# Patient Record
Sex: FEMALE | URBAN - METROPOLITAN AREA
[De-identification: names, ages, dates, MRNs, and addresses within clinical notes are randomized per-mention and may not be internally consistent; named-entity substitution may affect disease eponyms.]

---

## 2021-02-24 ENCOUNTER — APPOINTMENT (RX ONLY)
Dept: URBAN - METROPOLITAN AREA CLINIC 73 | Facility: CLINIC | Age: 56
Setting detail: DERMATOLOGY
End: 2021-02-24

## 2021-02-24 VITALS — TEMPERATURE: 98 F

## 2021-02-24 DIAGNOSIS — L29.8 OTHER PRURITUS: ICD-10-CM | Status: INADEQUATELY CONTROLLED

## 2021-02-24 DIAGNOSIS — L29.89 OTHER PRURITUS: ICD-10-CM | Status: INADEQUATELY CONTROLLED

## 2021-02-24 PROCEDURE — 99204 OFFICE O/P NEW MOD 45 MIN: CPT

## 2021-02-24 PROCEDURE — ? COUNSELING

## 2021-02-24 PROCEDURE — ? TREATMENT REGIMEN

## 2021-02-24 PROCEDURE — ? PRESCRIPTION

## 2021-02-24 RX ORDER — CRISABOROLE 20 MG/G
OINTMENT TOPICAL
Qty: 1 | Refills: 1 | Status: ERX | COMMUNITY
Start: 2021-02-24

## 2021-02-24 RX ADMIN — CRISABOROLE: 20 OINTMENT TOPICAL at 00:00

## 2021-02-24 ASSESSMENT — LOCATION DETAILED DESCRIPTION DERM
LOCATION DETAILED: RIGHT DISTAL PRETIBIAL REGION
LOCATION DETAILED: PERIUMBILICAL SKIN
LOCATION DETAILED: RIGHT SUPERIOR MEDIAL MIDBACK
LOCATION DETAILED: RIGHT ANTERIOR PROXIMAL THIGH
LOCATION DETAILED: LEFT ANTERIOR PROXIMAL THIGH
LOCATION DETAILED: LEFT PROXIMAL PRETIBIAL REGION

## 2021-02-24 ASSESSMENT — LOCATION SIMPLE DESCRIPTION DERM
LOCATION SIMPLE: ABDOMEN
LOCATION SIMPLE: RIGHT LOWER BACK
LOCATION SIMPLE: RIGHT THIGH
LOCATION SIMPLE: RIGHT PRETIBIAL REGION
LOCATION SIMPLE: LEFT PRETIBIAL REGION
LOCATION SIMPLE: LEFT THIGH

## 2021-02-24 ASSESSMENT — LOCATION ZONE DERM
LOCATION ZONE: TRUNK
LOCATION ZONE: LEG

## 2021-02-24 NOTE — PROCEDURE: TREATMENT REGIMEN
Samples Given: Eucrisa
Otc Regimen: sarna x3-5 daily\\nAllegra 180mg QAM and Zyrtec daily in afternoon\\nBenadryl pm prn
Detail Level: Zone
Initiate Treatment: Eucrisa ointment to body bid

## 2021-02-24 NOTE — HPI: ITCHING
How Did Your Itching Occur?: sudden in onset (over a period of weeks to a few months)
How Severe Is Your Itching?: moderate
Additional History: Pt presents for itching throughout body since 6/2020. Pt has been to several doctors and tx with cephalexin, topical steroids (Clobetasol, TAC), tx for shingles, then had biopsy which concluded infected hair follicle, then tried prednisone, tac, and antihistamines which did help with improvement. Pt reports itching was resolved from 9/2020-1/2021 and now recurred. Pt now using cortisone 10 and taking hot showers.

## 2021-02-24 NOTE — PROCEDURE: COUNSELING
Patient Specific Counseling (Will Not Stick From Patient To Patient): - pt presents for itching throughout body; initially started 6/2020 and had cleared after oral steroid taper. Pt has tried topical steroids prev (clobetasol ointment and TAC) which caused burning and did not help itching; only thing that helps is scalding hot showers but pt still wakes up at night from itching\\n- pt had previous bx from prev derm 6-7/2020 which concluded ?fungal infex of hair follicle - will retrieve path; no evidence of active dermatitis at this time\\n- pt had recent labs with pcp 2/22/21, hgba1c was 7.7 and recently started januvia.  Denies other lab abn - will get labs sent to us\\n- ROR signed today\\n- recommended sarna x3-5 daily\\n- start otc Allegra 180mg QAM and Zyrtec afternoon, Benadryl qhs prn\\n- start eucrisa ointment to body bid (did get Rx in recent past but at that time was not itchy)\\n- dry and sens skin care disc; spf 30+\\nrtc 1-2mo
Detail Level: Generalized

## 2021-03-09 ENCOUNTER — APPOINTMENT (RX ONLY)
Dept: URBAN - METROPOLITAN AREA CLINIC 74 | Facility: CLINIC | Age: 56
Setting detail: DERMATOLOGY
End: 2021-03-09

## 2021-03-09 DIAGNOSIS — L29.8 OTHER PRURITUS: ICD-10-CM

## 2021-03-09 DIAGNOSIS — L29.89 OTHER PRURITUS: ICD-10-CM

## 2021-03-09 PROCEDURE — ? ORDER TESTS

## 2021-03-09 NOTE — PROCEDURE: ORDER TESTS
Expected Date Of Service: 03/09/2021
Performing Laboratory: [id number]
Lab Facility: [id number]
Billing Type: Third-Party Bill
Bill For Surgical Tray: no

## 2021-07-22 ENCOUNTER — APPOINTMENT (RX ONLY)
Dept: URBAN - METROPOLITAN AREA CLINIC 74 | Facility: CLINIC | Age: 56
Setting detail: DERMATOLOGY
End: 2021-07-22

## 2021-07-22 DIAGNOSIS — L29.89 OTHER PRURITUS: ICD-10-CM | Status: INADEQUATELY CONTROLLED

## 2021-07-22 DIAGNOSIS — L29.8 OTHER PRURITUS: ICD-10-CM | Status: INADEQUATELY CONTROLLED

## 2021-07-22 PROCEDURE — ? ORDER TESTS

## 2021-07-22 PROCEDURE — ? COUNSELING

## 2021-07-22 PROCEDURE — ? PRESCRIPTION

## 2021-07-22 PROCEDURE — 99214 OFFICE O/P EST MOD 30 MIN: CPT

## 2021-07-22 PROCEDURE — ? TREATMENT REGIMEN

## 2021-07-22 RX ORDER — CLOBETASOL PROPIONATE 0.5 MG/G
CREAM TOPICAL
Qty: 1 | Refills: 2 | Status: CANCELLED | COMMUNITY
Start: 2021-07-22

## 2021-07-22 RX ADMIN — CLOBETASOL PROPIONATE: 0.5 CREAM TOPICAL at 00:00

## 2021-07-22 ASSESSMENT — LOCATION DETAILED DESCRIPTION DERM
LOCATION DETAILED: LEFT PROXIMAL PRETIBIAL REGION
LOCATION DETAILED: RIGHT SUPERIOR MEDIAL MIDBACK
LOCATION DETAILED: PERIUMBILICAL SKIN
LOCATION DETAILED: RIGHT DISTAL PRETIBIAL REGION
LOCATION DETAILED: LEFT SUPERIOR PARIETAL SCALP
LOCATION DETAILED: LEFT ANTERIOR PROXIMAL THIGH
LOCATION DETAILED: RIGHT ANTERIOR PROXIMAL THIGH

## 2021-07-22 ASSESSMENT — LOCATION SIMPLE DESCRIPTION DERM
LOCATION SIMPLE: SCALP
LOCATION SIMPLE: RIGHT LOWER BACK
LOCATION SIMPLE: LEFT THIGH
LOCATION SIMPLE: RIGHT PRETIBIAL REGION
LOCATION SIMPLE: LEFT PRETIBIAL REGION
LOCATION SIMPLE: ABDOMEN
LOCATION SIMPLE: RIGHT THIGH

## 2021-07-22 ASSESSMENT — LOCATION ZONE DERM
LOCATION ZONE: TRUNK
LOCATION ZONE: SCALP
LOCATION ZONE: LEG

## 2021-07-22 NOTE — PROCEDURE: COUNSELING
Patient Specific Counseling (Will Not Stick From Patient To Patient): - pt reports itching had resolved but returned after traveling recently last week - thought to be bites but minimal skin lesions - on right>left lower leg, post occiput, arms mainly- no primary skin lesions noted. Pt currently using TAC 0.1% cr x1 wk with only mild relief.\\n-denies new meds, topicals, activities etc; thinks under good diabetes control\\n-5-6/2021 labs show high hgb a1c but had improved from 7.7 to 6.5; rest of labs including TSH, HENRY, WNL; 3/2021 cbc and cmp mainly WNL; ferritin sl low normal at 36.  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
Detail Level: Generalized

## 2021-07-22 NOTE — PROCEDURE: ORDER TESTS
Bill For Surgical Tray: no
Expected Date Of Service: 07/22/2021
Performing Laboratory: 0
Billing Type: Third-Party Bill

## 2021-07-22 NOTE — PROCEDURE: TREATMENT REGIMEN
Otc Regimen: sarna lotion x3-5 daily\\nAllegra 180mg QAM and Zyrtec daily in afternoon\\nBenadryl pm prn
Continue Regimen: Clobetasol cr - aaa on body bid x10 days
Detail Level: Zone

## 2021-08-05 ENCOUNTER — RX ONLY (OUTPATIENT)
Age: 56
Setting detail: RX ONLY
End: 2021-08-05

## 2021-08-05 RX ORDER — CLOBETASOL PROPIONATE 0.5 MG/G
CREAM TOPICAL
Qty: 1 | Refills: 2 | Status: ERX

## 2021-09-10 ENCOUNTER — APPOINTMENT (RX ONLY)
Dept: URBAN - METROPOLITAN AREA CLINIC 74 | Facility: CLINIC | Age: 56
Setting detail: DERMATOLOGY
End: 2021-09-10

## 2021-09-10 DIAGNOSIS — L29.8 OTHER PRURITUS: ICD-10-CM | Status: INADEQUATELY CONTROLLED

## 2021-09-10 DIAGNOSIS — L29.89 OTHER PRURITUS: ICD-10-CM | Status: INADEQUATELY CONTROLLED

## 2021-09-10 PROCEDURE — ? COUNSELING

## 2021-09-10 PROCEDURE — ? ADDITIONAL NOTES

## 2021-09-10 PROCEDURE — ? PRESCRIPTION

## 2021-09-10 PROCEDURE — 99214 OFFICE O/P EST MOD 30 MIN: CPT

## 2021-09-10 PROCEDURE — ? TREATMENT REGIMEN

## 2021-09-10 RX ORDER — CLOBETASOL PROPIONATE 0.5 MG/G
CREAM TOPICAL
Qty: 60 | Refills: 2 | Status: ERX | COMMUNITY
Start: 2021-09-10

## 2021-09-10 RX ORDER — CRISABOROLE 20 MG/G
OINTMENT TOPICAL
Qty: 60 | Refills: 3 | Status: ERX | COMMUNITY
Start: 2021-09-10

## 2021-09-10 RX ADMIN — CLOBETASOL PROPIONATE: 0.5 CREAM TOPICAL at 00:00

## 2021-09-10 RX ADMIN — CRISABOROLE: 20 OINTMENT TOPICAL at 00:00

## 2021-09-10 ASSESSMENT — LOCATION ZONE DERM
LOCATION ZONE: LEG
LOCATION ZONE: SCALP
LOCATION ZONE: TRUNK

## 2021-09-10 ASSESSMENT — LOCATION SIMPLE DESCRIPTION DERM
LOCATION SIMPLE: RIGHT PRETIBIAL REGION
LOCATION SIMPLE: LEFT PRETIBIAL REGION
LOCATION SIMPLE: ABDOMEN
LOCATION SIMPLE: RIGHT LOWER BACK
LOCATION SIMPLE: LEFT THIGH
LOCATION SIMPLE: RIGHT THIGH
LOCATION SIMPLE: SCALP

## 2021-09-10 ASSESSMENT — LOCATION DETAILED DESCRIPTION DERM
LOCATION DETAILED: PERIUMBILICAL SKIN
LOCATION DETAILED: LEFT SUPERIOR PARIETAL SCALP
LOCATION DETAILED: RIGHT SUPERIOR MEDIAL MIDBACK
LOCATION DETAILED: LEFT PROXIMAL PRETIBIAL REGION
LOCATION DETAILED: RIGHT ANTERIOR PROXIMAL THIGH
LOCATION DETAILED: LEFT ANTERIOR PROXIMAL THIGH
LOCATION DETAILED: RIGHT DISTAL PRETIBIAL REGION

## 2021-09-10 NOTE — PROCEDURE: TREATMENT REGIMEN
Plan: Increase Allegra take 2 po bid
Otc Regimen: sarna lotion x3-5 daily\\nAllegra 180mg QAM and Zyrtec daily in afternoon\\nBenadryl pm prn
Continue Regimen: Clobetasol cr - aaa on body bid x10 days
Detail Level: Zone
Initiate Treatment: Eucrisa aaa on body bid

## 2021-09-10 NOTE — PROCEDURE: ADDITIONAL NOTES
Render Risk Assessment In Note?: no
Detail Level: Simple
Additional Notes: - pt reports condition inadequately controlled with clobetasol, sarna, Eucrisa, and antihistamines\\n- states itchiness worsens with heat and moves across body \\n- pt reports scratching new spots uncontrollably \\n- elevated hg1abc, and positive HENRY; will order HENRY subsets (lab slip given to pt today)\\n- pt reports hep b/c negative \\n- pt states steroids offer some relief but itchiness flares whenever pt d/c use of steroids \\n- pt denies hives, new meds, topicals, activities etc; thinks under good diabetes control\\n- reports Allegra wears off so pt takes Benadryl and Zyrtec\\n- recommended increase Allegra dose to 2 po bid \\n- restart Eucrisa aaa on body bid\\n- cont clobetasol cr aaa on body bid x 1 wk, then break x1 wk, repeat prn \\n- dry, sensitive skin care; spf 30+

## 2021-11-04 ENCOUNTER — APPOINTMENT (RX ONLY)
Dept: URBAN - METROPOLITAN AREA CLINIC 74 | Facility: CLINIC | Age: 56
Setting detail: DERMATOLOGY
End: 2021-11-04

## 2021-11-04 DIAGNOSIS — L29.89 OTHER PRURITUS: ICD-10-CM | Status: IMPROVED

## 2021-11-04 DIAGNOSIS — L29.8 OTHER PRURITUS: ICD-10-CM | Status: IMPROVED

## 2021-11-04 PROCEDURE — ? COUNSELING

## 2021-11-04 PROCEDURE — ? TREATMENT REGIMEN

## 2021-11-04 PROCEDURE — 99213 OFFICE O/P EST LOW 20 MIN: CPT

## 2021-11-04 ASSESSMENT — LOCATION DETAILED DESCRIPTION DERM
LOCATION DETAILED: LEFT PROXIMAL PRETIBIAL REGION
LOCATION DETAILED: LEFT ANTERIOR PROXIMAL THIGH
LOCATION DETAILED: MID-OCCIPITAL SCALP
LOCATION DETAILED: LEFT SUPERIOR PARIETAL SCALP
LOCATION DETAILED: MID POSTERIOR NECK
LOCATION DETAILED: RIGHT DISTAL PRETIBIAL REGION
LOCATION DETAILED: RIGHT ANTERIOR PROXIMAL THIGH
LOCATION DETAILED: PERIUMBILICAL SKIN
LOCATION DETAILED: RIGHT SUPERIOR MEDIAL MIDBACK

## 2021-11-04 ASSESSMENT — LOCATION ZONE DERM
LOCATION ZONE: SCALP
LOCATION ZONE: NECK
LOCATION ZONE: TRUNK
LOCATION ZONE: LEG

## 2021-11-04 ASSESSMENT — LOCATION SIMPLE DESCRIPTION DERM
LOCATION SIMPLE: POSTERIOR SCALP
LOCATION SIMPLE: SCALP
LOCATION SIMPLE: RIGHT THIGH
LOCATION SIMPLE: RIGHT PRETIBIAL REGION
LOCATION SIMPLE: POSTERIOR NECK
LOCATION SIMPLE: LEFT PRETIBIAL REGION
LOCATION SIMPLE: ABDOMEN
LOCATION SIMPLE: RIGHT LOWER BACK
LOCATION SIMPLE: LEFT THIGH

## 2021-11-04 NOTE — PROCEDURE: TREATMENT REGIMEN
Otc Regimen: sarna lotion x3-5 daily\\nslowfe w/ vit c and MVI daily
Continue Regimen: Prn flares, Clobetasol cr - aaa on body bid x10 days
Detail Level: Zone

## 2021-11-04 NOTE — PROCEDURE: COUNSELING
Patient Specific Counseling (Will Not Stick From Patient To Patient): - pt reports pruritus improved about 90% since last ov; mildly itchy on mid-occipital scalp/neck and under breasts\\n- d/w pt itching likely contributed from high hgba1c; now stable\\n- pt thinks could be correlated to heat\\n- recommended start slowfe w/ vit c (slight anemic) and MVI daily\\n- prn flares, cont clobetasol cr aaa on body bid x 1 wk, then break x1 wk, repeat prn \\n- cont Sarna lotion prn\\n- advised to have pcp recheck iron studies, hgb a1c, etc\\n- cont dry and sens skin care; spf 30+
Detail Level: Generalized

## 2021-11-04 NOTE — PROCEDURE: MIPS QUALITY
Quality 130: Documentation Of Current Medications In The Medical Record: Current Medications Documented
Additional Notes: Pt received COVID vaccines
Detail Level: Detailed
Quality 110: Preventive Care And Screening: Influenza Immunization: Influenza Immunization Administered during Influenza season